# Patient Record
Sex: MALE | Race: WHITE | NOT HISPANIC OR LATINO | Employment: FULL TIME | URBAN - NONMETROPOLITAN AREA
[De-identification: names, ages, dates, MRNs, and addresses within clinical notes are randomized per-mention and may not be internally consistent; named-entity substitution may affect disease eponyms.]

---

## 2020-07-18 ENCOUNTER — HOSPITAL ENCOUNTER (EMERGENCY)
Facility: HOSPITAL | Age: 45
Discharge: HOME | End: 2020-07-18
Attending: EMERGENCY MEDICINE | Admitting: EMERGENCY MEDICINE
Payer: COMMERCIAL

## 2020-07-18 ENCOUNTER — APPOINTMENT (EMERGENCY)
Dept: NON INVASIVE DIAGNOSTICS | Facility: HOSPITAL | Age: 45
End: 2020-07-18
Payer: COMMERCIAL

## 2020-07-18 VITALS
HEIGHT: 69 IN | WEIGHT: 315 LBS | DIASTOLIC BLOOD PRESSURE: 77 MMHG | OXYGEN SATURATION: 99 % | RESPIRATION RATE: 16 BRPM | BODY MASS INDEX: 46.65 KG/M2 | HEART RATE: 76 BPM | TEMPERATURE: 98.1 F | SYSTOLIC BLOOD PRESSURE: 138 MMHG

## 2020-07-18 DIAGNOSIS — L03.115 CELLULITIS OF RIGHT LOWER LEG: Primary | ICD-10-CM

## 2020-07-18 DIAGNOSIS — R60.0 LEG EDEMA, RIGHT: ICD-10-CM

## 2020-07-18 LAB
ANION GAP SERPL CALCULATED.3IONS-SCNC: 9 MMOL/L (ref 4–13)
BASOPHILS # BLD AUTO: 0.05 THOUSANDS/ΜL (ref 0–0.1)
BASOPHILS NFR BLD AUTO: 1 % (ref 0–1)
BUN SERPL-MCNC: 13 MG/DL (ref 5–25)
CALCIUM SERPL-MCNC: 9.2 MG/DL (ref 8.3–10.1)
CHLORIDE SERPL-SCNC: 100 MMOL/L (ref 100–108)
CO2 SERPL-SCNC: 27 MMOL/L (ref 21–32)
CREAT SERPL-MCNC: 0.96 MG/DL (ref 0.6–1.3)
EOSINOPHIL # BLD AUTO: 0.32 THOUSAND/ΜL (ref 0–0.61)
EOSINOPHIL NFR BLD AUTO: 4 % (ref 0–6)
ERYTHROCYTE [DISTWIDTH] IN BLOOD BY AUTOMATED COUNT: 12.9 % (ref 11.6–15.1)
GFR SERPL CREATININE-BSD FRML MDRD: 95 ML/MIN/1.73SQ M
GLUCOSE SERPL-MCNC: 195 MG/DL (ref 65–140)
HCT VFR BLD AUTO: 44.1 % (ref 36.5–49.3)
HGB BLD-MCNC: 14.8 G/DL (ref 12–17)
IMM GRANULOCYTES # BLD AUTO: 0.04 THOUSAND/UL (ref 0–0.2)
IMM GRANULOCYTES NFR BLD AUTO: 1 % (ref 0–2)
LYMPHOCYTES # BLD AUTO: 1.58 THOUSANDS/ΜL (ref 0.6–4.47)
LYMPHOCYTES NFR BLD AUTO: 19 % (ref 14–44)
MCH RBC QN AUTO: 28.1 PG (ref 26.8–34.3)
MCHC RBC AUTO-ENTMCNC: 33.6 G/DL (ref 31.4–37.4)
MCV RBC AUTO: 84 FL (ref 82–98)
MONOCYTES # BLD AUTO: 0.56 THOUSAND/ΜL (ref 0.17–1.22)
MONOCYTES NFR BLD AUTO: 7 % (ref 4–12)
NEUTROPHILS # BLD AUTO: 5.99 THOUSANDS/ΜL (ref 1.85–7.62)
NEUTS SEG NFR BLD AUTO: 68 % (ref 43–75)
NRBC BLD AUTO-RTO: 0 /100 WBCS
PLATELET # BLD AUTO: 215 THOUSANDS/UL (ref 149–390)
PMV BLD AUTO: 11 FL (ref 8.9–12.7)
POTASSIUM SERPL-SCNC: 4.2 MMOL/L (ref 3.5–5.3)
RBC # BLD AUTO: 5.27 MILLION/UL (ref 3.88–5.62)
SODIUM SERPL-SCNC: 136 MMOL/L (ref 136–145)
WBC # BLD AUTO: 8.54 THOUSAND/UL (ref 4.31–10.16)

## 2020-07-18 PROCEDURE — 80048 BASIC METABOLIC PNL TOTAL CA: CPT | Performed by: PHYSICIAN ASSISTANT

## 2020-07-18 PROCEDURE — 99284 EMERGENCY DEPT VISIT MOD MDM: CPT

## 2020-07-18 PROCEDURE — 87040 BLOOD CULTURE FOR BACTERIA: CPT | Performed by: PHYSICIAN ASSISTANT

## 2020-07-18 PROCEDURE — 96375 TX/PRO/DX INJ NEW DRUG ADDON: CPT

## 2020-07-18 PROCEDURE — 99285 EMERGENCY DEPT VISIT HI MDM: CPT | Performed by: PHYSICIAN ASSISTANT

## 2020-07-18 PROCEDURE — 96365 THER/PROPH/DIAG IV INF INIT: CPT

## 2020-07-18 PROCEDURE — 85025 COMPLETE CBC W/AUTO DIFF WBC: CPT | Performed by: PHYSICIAN ASSISTANT

## 2020-07-18 PROCEDURE — 36415 COLL VENOUS BLD VENIPUNCTURE: CPT | Performed by: PHYSICIAN ASSISTANT

## 2020-07-18 PROCEDURE — 93971 EXTREMITY STUDY: CPT

## 2020-07-18 RX ORDER — SULFAMETHOXAZOLE AND TRIMETHOPRIM 400; 80 MG/1; MG/1
TABLET ORAL 2 TIMES DAILY
COMMUNITY
Start: 2020-07-13 | End: 2020-07-23

## 2020-07-18 RX ORDER — CLINDAMYCIN PHOSPHATE 600 MG/50ML
600 INJECTION INTRAVENOUS ONCE
Status: COMPLETED | OUTPATIENT
Start: 2020-07-18 | End: 2020-07-18

## 2020-07-18 RX ORDER — KETOROLAC TROMETHAMINE 30 MG/ML
15 INJECTION, SOLUTION INTRAMUSCULAR; INTRAVENOUS ONCE
Status: COMPLETED | OUTPATIENT
Start: 2020-07-18 | End: 2020-07-18

## 2020-07-18 RX ORDER — ENALAPRIL MALEATE 5 MG/1
10 TABLET ORAL DAILY
COMMUNITY

## 2020-07-18 RX ORDER — CLINDAMYCIN HYDROCHLORIDE 300 MG/1
300 CAPSULE ORAL 4 TIMES DAILY
Qty: 28 CAPSULE | Refills: 0 | Status: SHIPPED | OUTPATIENT
Start: 2020-07-18 | End: 2020-07-25

## 2020-07-18 RX ADMIN — KETOROLAC TROMETHAMINE 15 MG: 30 INJECTION, SOLUTION INTRAMUSCULAR at 13:33

## 2020-07-18 RX ADMIN — CLINDAMYCIN PHOSPHATE 600 MG: 600 INJECTION, SOLUTION INTRAVENOUS at 14:13

## 2020-07-18 NOTE — ED PROVIDER NOTES
History  Chief Complaint   Patient presents with    Cellulitis     pt seen at urgent care 6 days ago per right lower extremity swelling  currently taking abx per cellulitis c/o no improvement since w/increased pain, swelling, and drainage RLE  pt is truckdriver  denies fevers/sob/n/v/d     59-year-old male presents to the ED for evaluation of right lower extremity pain and swelling  Per patient approximately 2 weeks ago he had several bug bites on the right lower extremity from a backyard party  He reports he was itching at these and noticed the leg was getting red and swollen  Patient was seen at urgent care 6 days ago and started on Bactrim for cellulitis  Patient reports since taking Bactrim swelling has increased  He reports swelling is now into his foot  He additionally notes pain is worsening  He reports he has had some drainage from the leg  He does report that the redness is improving  Patient return to Urgent Care today who sent him for further evaluation concern for DVT vs worsening cellulitis  Patient denies history of DVT or PE  He denies any use of anticoagulation  Patient works as a   He denies any chest pain, shortness of breath, palpitations, abdominal pain, flank pain, hemoptysis  Patient denies any fevers or chills  Prior to Admission Medications   Prescriptions Last Dose Informant Patient Reported? Taking?   enalapril (VASOTEC) 5 mg tablet 7/18/2020 at Unknown time  Yes Yes   Sig: Take 10 mg by mouth daily   sulfamethoxazole-trimethoprim (BACTRIM) 400-80 mg per tablet 7/18/2020 at Unknown time  Yes Yes   Sig: Take by mouth 2 (two) times a day      Facility-Administered Medications: None       Past Medical History:   Diagnosis Date    Hypertension        Past Surgical History:   Procedure Laterality Date    CHOLECYSTECTOMY         History reviewed  No pertinent family history  I have reviewed and agree with the history as documented      E-Cigarette/Vaping    E-Cigarette Use Never User      E-Cigarette/Vaping Substances     Social History     Tobacco Use    Smoking status: Never Smoker    Smokeless tobacco: Current User     Types: Chew   Substance Use Topics    Alcohol use: Not Currently    Drug use: Never       Review of Systems   Constitutional: Negative for appetite change, chills, diaphoresis, fatigue and fever  HENT: Negative  Eyes: Negative for visual disturbance  Respiratory: Negative for cough, choking, chest tightness, shortness of breath and stridor  Cardiovascular: Positive for leg swelling  Negative for chest pain and palpitations  Gastrointestinal: Negative  Genitourinary: Negative  Musculoskeletal:        Right lower extremity swelling   Skin: Positive for color change  Redness of right lower extremity   Neurological: Negative  Physical Exam  Physical Exam   Constitutional: He is oriented to person, place, and time  He appears well-developed and well-nourished  No distress  HENT:   Head: Normocephalic and atraumatic  Mouth/Throat: No oropharyngeal exudate  Eyes: Pupils are equal, round, and reactive to light  Conjunctivae and EOM are normal    Neck: Normal range of motion  Cardiovascular: Normal rate and regular rhythm  No murmur heard  Pulmonary/Chest: Effort normal and breath sounds normal  No stridor  No respiratory distress  He has no wheezes  He has no rales  He exhibits no tenderness  Abdominal: Soft  Bowel sounds are normal  He exhibits no distension  There is no tenderness  There is no guarding  Musculoskeletal: Normal range of motion  He exhibits tenderness  He exhibits no deformity  Right ankle: He exhibits swelling  He exhibits normal range of motion, no ecchymosis, no deformity and no laceration  Right lower leg: He exhibits tenderness and swelling  He exhibits no bony tenderness  Legs:       Right foot: There is swelling   There is normal range of motion and no bony tenderness  Swelling of the right foot and calf  Normal ROM  Palpable dorsalis pedis and posterior tibialis pulses bilaterally  Normal cap refill  Neurological: He is alert and oriented to person, place, and time  He displays normal reflexes  No sensory deficit  He exhibits normal muscle tone  GCS eye subscore is 4  GCS verbal subscore is 5  GCS motor subscore is 6  Skin: Skin is warm and dry  Capillary refill takes less than 2 seconds  There is erythema  Swelling of the right foot and calf  Mild tenderness  No active drainage  Scabbed wounds noted on shin  Redness noted around the right ankle  Compartments soft and not concerning for comparment symptom   Psychiatric: He has a normal mood and affect  His behavior is normal  Judgment and thought content normal    Nursing note and vitals reviewed        Vital Signs  ED Triage Vitals [07/18/20 1309]   Temperature Pulse Respirations Blood Pressure SpO2   (!) 97 3 °F (36 3 °C) 80 18 166/97 98 %      Temp Source Heart Rate Source Patient Position - Orthostatic VS BP Location FiO2 (%)   Temporal Monitor Lying Right arm --      Pain Score       2           Vitals:    07/18/20 1309 07/18/20 1400 07/18/20 1430 07/18/20 1455   BP: 166/97 151/78 138/77 138/77   Pulse: 80 68 73 76   Patient Position - Orthostatic VS: Lying Lying Lying          Visual Acuity      ED Medications  Medications   ketorolac (TORADOL) injection 15 mg (15 mg Intravenous Given 7/18/20 1333)   clindamycin (CLEOCIN) IVPB (premix) 600 mg 50 mL (0 mg Intravenous Stopped 7/18/20 1451)       Diagnostic Studies  Results Reviewed     Procedure Component Value Units Date/Time    Basic metabolic panel [[id number]]  (Abnormal) Collected:  07/18/20 1332    Lab Status:  Final result Specimen:  Blood from Arm, Left Updated:  07/18/20 1355     Sodium 136 mmol/L      Potassium 4 2 mmol/L      Chloride 100 mmol/L      CO2 27 mmol/L      ANION GAP 9 mmol/L      BUN 13 mg/dL      Creatinine 0 96 mg/dL      Glucose 195 mg/dL      Calcium 9 2 mg/dL      eGFR 95 ml/min/1 73sq m     Narrative:       National Kidney Disease Foundation guidelines for Chronic Kidney Disease (CKD):     Stage 1 with normal or high GFR (GFR > 90 mL/min/1 73 square meters)    Stage 2 Mild CKD (GFR = 60-89 mL/min/1 73 square meters)    Stage 3A Moderate CKD (GFR = 45-59 mL/min/1 73 square meters)    Stage 3B Moderate CKD (GFR = 30-44 mL/min/1 73 square meters)    Stage 4 Severe CKD (GFR = 15-29 mL/min/1 73 square meters)    Stage 5 End Stage CKD (GFR <15 mL/min/1 73 square meters)  Note: GFR calculation is accurate only with a steady state creatinine    CBC and differential [[id number]] Collected:  07/18/20 1332    Lab Status:  Final result Specimen:  Blood from Arm, Left Updated:  07/18/20 1341     WBC 8 54 Thousand/uL      RBC 5 27 Million/uL      Hemoglobin 14 8 g/dL      Hematocrit 44 1 %      MCV 84 fL      MCH 28 1 pg      MCHC 33 6 g/dL      RDW 12 9 %      MPV 11 0 fL      Platelets 178 Thousands/uL      nRBC 0 /100 WBCs      Neutrophils Relative 68 %      Immat GRANS % 1 %      Lymphocytes Relative 19 %      Monocytes Relative 7 %      Eosinophils Relative 4 %      Basophils Relative 1 %      Neutrophils Absolute 5 99 Thousands/µL      Immature Grans Absolute 0 04 Thousand/uL      Lymphocytes Absolute 1 58 Thousands/µL      Monocytes Absolute 0 56 Thousand/µL      Eosinophils Absolute 0 32 Thousand/µL      Basophils Absolute 0 05 Thousands/µL     Blood culture #1 [[id number]] Collected:  07/18/20 1332    Lab Status: In process Specimen:  Blood from Arm, Left Updated:  07/18/20 1339    Blood culture #2 [[id number]] Collected:  07/18/20 1332    Lab Status:   In process Specimen:  Blood from Arm, Right Updated:  07/18/20 1339                 VAS lower limb venous duplex study, unilateral/limited    (Results Pending)              Procedures  Procedures         ED Course  ED Course as of Jul 18 1545   Sat Jul 18, 2020   1307 Tiger text sent to vascular technician      3217 Vascular technician reports negative DVT      1407 Normal WBC  CMP and BMP relatively unremarkable  Will start IV antibiotics  I had long discussion with patient and spouse regarding inpatient versus outpatient treatment  I advised and recommended inpatient treatment as patient has failed p o  Treatment  We discussed possible side effects of continued female outpatient including sepsis and death  Patient requesting IV antibiotics while in the ED and discharged home  We discussed symptoms that require prompt return to the ED for further evaluation and he verbalized understanding  1450 Patient received full dose of IV antibiotics  Patient was able to ambulate of the department without difficulty  Again patient was educated on symptoms that require prompt return to the ED for further evaluation verbalized understanding  Patient agreed with this treatment plan, remained well ED and was discharged home  US AUDIT      Most Recent Value   Initial Alcohol Screen: US AUDIT-C    1  How often do you have a drink containing alcohol? 1 Filed at: 07/18/2020 1312   2  How many drinks containing alcohol do you have on a typical day you are drinking? 0 Filed at: 07/18/2020 1312   3a  Male UNDER 65: How often do you have five or more drinks on one occasion? 0 Filed at: 07/18/2020 1312   Audit-C Score  1 Filed at: 07/18/2020 1312                  CORAL/DAST-10      Most Recent Value   How many times in the past year have you    Used an illegal drug or used a prescription medication for non-medical reasons? Never Filed at: 07/18/2020 1312                    MDM  Number of Diagnoses or Management Options  Cellulitis of right lower leg: new and requires workup  Diagnosis management comments: Differential diagnosis includes but not limited to:  Cellulitis, DVT, compartment syndrome   Vitals medical record reviewed  Patient has a swelling and redness right lower extremity    Noted scab wounds from reported bug bites  Has been on Bactrim without significant of symptoms  Duplex was negative for vascular technician  Calf was swollen however hard compartment was relatively soft, pain was not out of proportion to exam   No leukocytosis  Patient received IV clindamycin  Recommended inpatient treatment however patient refused requested IV antibiotics in the ED and discharged on oral antibiotics  Patient was educated on symptoms that require prompt return to the ED for further evaluation verbalized understanding  Patient agreed to this treatment plan, remained well ED and was discharged home       Amount and/or Complexity of Data Reviewed  Clinical lab tests: ordered and reviewed  Tests in the radiology section of CPT®: ordered and reviewed  Discussion of test results with the performing providers: yes  Review and summarize past medical records: yes  Independent visualization of images, tracings, or specimens: yes          Disposition  Final diagnoses:   Cellulitis of right lower leg     Time reflects when diagnosis was documented in both MDM as applicable and the Disposition within this note     Time User Action Codes Description Comment    7/18/2020  2:00 PM Shanna Evangelista Add [R60 0] Leg edema, right     7/18/2020  2:39 PM Elizabeth Rivera Add [L03 115] Cellulitis of right lower leg       ED Disposition     ED Disposition Condition Date/Time Comment    Discharge Stable Sat Jul 18, 2020  2:39 PM Carline Price discharge to home/self care              Follow-up Information     Follow up With Specialties Details Why Drew U  94  In 1 week As needed, If symptoms worsen [address] [place] [id number]  [phone]            Discharge Medication List as of 7/18/2020  2:42 PM      START taking these medications    Details   clindamycin (CLEOCIN) 300 MG capsule Take 1 capsule (300 mg total) by mouth 4 (four) times a day for 7 days, Starting Sat 7/18/2020, Until Sat 7/25/2020, Normal         CONTINUE these medications which have NOT CHANGED    Details   enalapril (VASOTEC) 5 mg tablet Take 10 mg by mouth daily, Historical Med      sulfamethoxazole-trimethoprim (BACTRIM) 400-80 mg per tablet Take by mouth 2 (two) times a day, Starting Mon 7/13/2020, Until Thu 7/23/2020, Historical Med           No discharge procedures on file      PDMP Review     None          ED Provider  Electronically Signed by           Abigail Michele PA-C  07/18/20 7791

## 2020-07-19 PROCEDURE — 93971 EXTREMITY STUDY: CPT | Performed by: SURGERY

## 2020-07-23 LAB
BACTERIA BLD CULT: NORMAL
BACTERIA BLD CULT: NORMAL

## 2022-01-05 ENCOUNTER — APPOINTMENT (OUTPATIENT)
Dept: RADIOLOGY | Facility: CLINIC | Age: 47
End: 2022-01-05
Payer: COMMERCIAL

## 2022-01-05 ENCOUNTER — OFFICE VISIT (OUTPATIENT)
Dept: URGENT CARE | Facility: CLINIC | Age: 47
End: 2022-01-05
Payer: COMMERCIAL

## 2022-01-05 VITALS
BODY MASS INDEX: 46.65 KG/M2 | WEIGHT: 315 LBS | HEIGHT: 69 IN | TEMPERATURE: 100.9 F | SYSTOLIC BLOOD PRESSURE: 136 MMHG | RESPIRATION RATE: 20 BRPM | DIASTOLIC BLOOD PRESSURE: 96 MMHG | HEART RATE: 98 BPM | OXYGEN SATURATION: 94 %

## 2022-01-05 DIAGNOSIS — R68.89 FLU-LIKE SYMPTOMS: ICD-10-CM

## 2022-01-05 DIAGNOSIS — J18.9 PNEUMONIA OF BOTH LOWER LOBES DUE TO INFECTIOUS ORGANISM: Primary | ICD-10-CM

## 2022-01-05 DIAGNOSIS — J06.9 VIRAL URI: ICD-10-CM

## 2022-01-05 LAB
GLUCOSE SERPL-MCNC: 145 MG/DL (ref 65–140)
SL AMB POCT GLUCOSE BLD: 145

## 2022-01-05 PROCEDURE — 71046 X-RAY EXAM CHEST 2 VIEWS: CPT

## 2022-01-05 PROCEDURE — 82948 REAGENT STRIP/BLOOD GLUCOSE: CPT | Performed by: PHYSICIAN ASSISTANT

## 2022-01-05 PROCEDURE — U0003 INFECTIOUS AGENT DETECTION BY NUCLEIC ACID (DNA OR RNA); SEVERE ACUTE RESPIRATORY SYNDROME CORONAVIRUS 2 (SARS-COV-2) (CORONAVIRUS DISEASE [COVID-19]), AMPLIFIED PROBE TECHNIQUE, MAKING USE OF HIGH THROUGHPUT TECHNOLOGIES AS DESCRIBED BY CMS-2020-01-R: HCPCS | Performed by: PHYSICIAN ASSISTANT

## 2022-01-05 PROCEDURE — 96372 THER/PROPH/DIAG INJ SC/IM: CPT | Performed by: PHYSICIAN ASSISTANT

## 2022-01-05 PROCEDURE — 99213 OFFICE O/P EST LOW 20 MIN: CPT | Performed by: PHYSICIAN ASSISTANT

## 2022-01-05 RX ORDER — ENALAPRIL MALEATE 10 MG/1
TABLET ORAL
COMMUNITY
Start: 2021-10-12

## 2022-01-05 RX ORDER — ERGOCALCIFEROL (VITAMIN D2) 1250 MCG
50000 CAPSULE ORAL
COMMUNITY

## 2022-01-05 RX ORDER — GUAIFENESIN 100 MG/5ML
200 SYRUP ORAL 3 TIMES DAILY PRN
COMMUNITY

## 2022-01-05 RX ORDER — AZITHROMYCIN 250 MG/1
TABLET, FILM COATED ORAL
Qty: 6 TABLET | Refills: 0 | Status: SHIPPED | OUTPATIENT
Start: 2022-01-05 | End: 2022-01-09

## 2022-01-05 RX ORDER — KETOROLAC TROMETHAMINE 30 MG/ML
30 INJECTION, SOLUTION INTRAMUSCULAR; INTRAVENOUS ONCE
Status: COMPLETED | OUTPATIENT
Start: 2022-01-05 | End: 2022-01-05

## 2022-01-05 RX ADMIN — KETOROLAC TROMETHAMINE 30 MG: 30 INJECTION, SOLUTION INTRAMUSCULAR; INTRAVENOUS at 16:00

## 2022-01-05 NOTE — PATIENT INSTRUCTIONS
Patient given pulse oximeter to use at home with strict instructions to the emergency room if it drops below 90% with or without ambulation  He was also given symptoms spirometer to use 7-10 times an hour while awake  Today you were tested for COVID-19 and influenza  Results will return approximately 5 days  The fastest way to receive results is to download the [facility] MyChart amol on your phone or great account on a computer  If you view your results on [employer], we will not call you  If you do not see results on [employer], we will call you if your positive or negative  WE CANNOT PRINT YOUR TEST RESULTS FROM THE URGENT CARE  This is against a law called HIPAA  You may print results from your MyChart (IT help [phone] option 5) or call medical records at [phone]  Prophylactically self quarantine  Department of health's newest recommendations as of December 27,2021 state the following:    IF you TEST POSITIVE for COVID-19  -stay home for 5 days  If you have no symptoms or your symptoms are resolving after 5 days, you can leave your house  Continue to wear a mask around others for 5 additional days  If you have a fever, continue to stay home until you fever resolves  IF YOU WERE EXPOSED TO SOMEONE WITH COVID 19  -if you have been boosted OR completed the primary series of Pfizer or Moderna vaccine within the last 6 months OR completed the primary series of J&J vaccine within the last 2 months, wear a mask around others for 10 days  Get tested on day 5 if possible  If you develop symptoms, get a test and stay home     -if you completed the primary series of Pfizer or Moderna vaccine over 6 months ago and are NOT boosted OR completed the primary series of J&J over 2 months ago and are NOT boosted OR are unvaccinated, stay home for 5 days  After that continue to wear a mask around others for 5 additional days  If you can not quarantine you must wear a mask for 10 days    Test on day 5 if possible  If you develops symptoms get a test and stay home  Drink lots of fluids to maintain hydration  Do not touch your face, wash hands often, and practice social distancing  There is no treatment for simple outpatient COVID-19 patients however, CDC recommends 2000 units vitamin D3 to boost the immune system  Those with severe illness, older age, or multiple comorbidities may qualify for monoclonal antibody infusions as treatment  Please call your doctor to see if you qualify  Call your family doctor to have a follow-up appointment in next few days  Go to ER if he began experiencing chest pain, shortness of breath, fever that is not responding to antipyretics or other severe symptoms

## 2022-01-05 NOTE — LETTER
[facility] [place]  [address] [place] [address]  Dept: [phone]    January 5, 2022    Patient: Cem Neil  YOB: 1975    Cem Neil was seen and evaluated at our [facility]  Please note if Covid and Flu tests are negative, they may return to work when fever free for 24 hours without the use of a fever reducing agent  If Covid or Flu test is positive, they may return to work on 1/7/2022, as this is 5 days from the onset of symptoms  Upon return, they must then adhere to strict masking for an additional 5 days      Sincerely,    Bc Lombardo PA-C

## 2022-01-05 NOTE — PROGRESS NOTES
[facility]        NAME: Abdiaziz Jean Baptiste is a 55 y o  male  : 1975    MRN: [id number]  DATE: 2022  TIME: 4:23 PM    Assessment and Plan   Pneumonia of both lower lobes due to infectious organism [J18 9]  1  Pneumonia of both lower lobes due to infectious organism  COVID Only -Office Collect    XR chest pa & lateral    azithromycin (ZITHROMAX) 250 mg tablet   2  Flu-like symptoms  ketorolac (TORADOL) injection 30 mg    POCT blood glucose     Improvement of headache after Toradol injection  Chest x-ray:  Per Radiology, "Suspected developing infiltrate/atelectasis both lung bases"  Will treat for bacterial pneumonia and also rule out COVID-19/ COVID-19 pneumonia  Patient given pulse oximeter to use at home with strict instructions to the emergency room if it drops below 90% with or without ambulation  He was also given symptoms spirometer to use 7-10 times an hour while awake  Patient Instructions   Today you were tested for COVID-19 and influenza  Results will return approximately 5 days  The fastest way to receive results is to download the [facility] [employer] amol on your phone or great account on a computer  If you view your results on [employer], we will not call you  If you do not see results on [employer], we will call you if your positive or negative  WE CANNOT PRINT YOUR TEST RESULTS FROM THE URGENT CARE  This is against a law called HIPAA  You may print results from your [employer] (IT help [phone] option 5) or call medical records at [phone]  Prophylactically self quarantine  Department of health's newest recommendations as of  state the following:    IF you TEST POSITIVE for COVID-19  -stay home for 5 days  If you have no symptoms or your symptoms are resolving after 5 days, you can leave your house  Continue to wear a mask around others for 5 additional days  If you have a fever, continue to stay home until you fever resolves      IF YOU WERE EXPOSED TO SOMEONE WITH COVID 19  -if you have been boosted OR completed the primary series of Pfizer or Moderna vaccine within the last 6 months OR completed the primary series of J&J vaccine within the last 2 months, wear a mask around others for 10 days  Get tested on day 5 if possible  If you develop symptoms, get a test and stay home     -if you completed the primary series of Pfizer or Moderna vaccine over 6 months ago and are NOT boosted OR completed the primary series of J&J over 2 months ago and are NOT boosted OR are unvaccinated, stay home for 5 days  After that continue to wear a mask around others for 5 additional days  If you can not quarantine you must wear a mask for 10 days  Test on day 5 if possible  If you develops symptoms get a test and stay home  Drink lots of fluids to maintain hydration  Do not touch your face, wash hands often, and practice social distancing  There is no treatment for simple outpatient COVID-19 patients however, CDC recommends 2000 units vitamin D3 to boost the immune system  Those with severe illness, older age, or multiple comorbidities may qualify for monoclonal antibody infusions as treatment  Please call your doctor to see if you qualify  Call your family doctor to have a follow-up appointment in next few days  Go to ER if he began experiencing chest pain, shortness of breath, fever that is not responding to antipyretics or other severe symptoms  Follow up with PCP in 3-5 days  Proceed to  ER if symptoms worsen  Chief Complaint     Chief Complaint   Patient presents with    COVID-19     h/a x 3 days with nausea, hands tingling, fatigue and cough with congestion and runny nose         History of Present Illness       Patient is a 17-year-old male with significant past medical history of hypertension and diabetes presents the office complaining of fatigue, body aches, fevers chills, headache, congestion, rhinorrhea, and cough for 3 days  Patient also reports goes in severe coughing fits which causes his hands to tingle but resolves after coughing  He denies SOB, CP, difficulty breathing, loss of smell or taste, nausea, vomiting, or abdominal pain  Denies any known exposure to COVID-19  Patient's biggest complaint right now is his headache which is rated 8/10 described as a halo around his skull  Review of Systems   Review of Systems   Constitutional: Positive for chills, fatigue and fever  HENT: Positive for congestion, postnasal drip, rhinorrhea and sore throat  Respiratory: Positive for cough  Negative for shortness of breath  Cardiovascular: Negative for chest pain and palpitations  Gastrointestinal: Positive for nausea  Negative for abdominal pain, diarrhea and vomiting  Musculoskeletal: Positive for myalgias  Neurological: Positive for dizziness and headaches  Negative for syncope, weakness and light-headedness  Current Medications       Current Outpatient Medications:     enalapril (VASOTEC) 10 mg tablet, , Disp: , Rfl:     enalapril (VASOTEC) 5 mg tablet, Take 10 mg by mouth daily, Disp: , Rfl:     ergocalciferol (ERGOCALCIFEROL) 1 25 MG (79693 UT) capsule, Take 50,000 Units by mouth, Disp: , Rfl:     guaiFENesin (ROBITUSSIN) 100 mg/5 mL syrup, Take 200 mg by mouth 3 (three) times a day as needed for cough, Disp: , Rfl:     metFORMIN (GLUCOPHAGE) 500 mg tablet, , Disp: , Rfl:     azithromycin (ZITHROMAX) 250 mg tablet, Take 2 tablets today then 1 tablet daily x 4 days, Disp: 6 tablet, Rfl: 0  No current facility-administered medications for this visit      Current Allergies     Allergies as of 01/05/2022 - Reviewed 01/05/2022   Allergen Reaction Noted    Penicillins Throat Swelling 07/18/2020            The following portions of the patient's history were reviewed and updated as appropriate: allergies, current medications, past family history, past medical history, past social history, past surgical history and problem list      Past Medical History:   Diagnosis Date    Diabetes mellitus ([address] )     Hypertension        Past Surgical History:   Procedure Laterality Date    ADENOIDECTOMY      CHOLECYSTECTOMY      TONSILLECTOMY      WISDOM TOOTH EXTRACTION         Family History   Problem Relation Age of Onset    Cancer Mother     Heart disease Mother     Stroke Mother     No Known Problems Father          Medications have been verified  Objective   /96   Pulse 98   Temp (!) 100 9 °F (38 3 °C)   Resp 20   Ht 5' 9" (1 753 m)   Wt (!) 148 kg (326 lb)   SpO2 94%   BMI 48 14 kg/m²   No LMP for male patient  Physical Exam     Physical Exam  Vitals and nursing note reviewed  Constitutional:       General: He is not in acute distress  Appearance: Normal appearance  He is well-developed  He is ill-appearing  He is not toxic-appearing  HENT:      Head: Normocephalic and atraumatic  Right Ear: Tympanic membrane, ear canal and external ear normal       Left Ear: Tympanic membrane, ear canal and external ear normal       Nose: Congestion and rhinorrhea present  Mouth/Throat:      Pharynx: Uvula midline  Eyes:      General: Lids are normal       Conjunctiva/sclera: Conjunctivae normal       Pupils: Pupils are equal, round, and reactive to light  Cardiovascular:      Rate and Rhythm: Normal rate and regular rhythm  Heart sounds: Normal heart sounds  No murmur heard  No friction rub  No gallop  Pulmonary:      Effort: Pulmonary effort is normal       Breath sounds: Normal breath sounds  No stridor  No wheezing or rales  Musculoskeletal:         General: Normal range of motion  Cervical back: Neck supple  Skin:     General: Skin is warm and dry  Capillary Refill: Capillary refill takes less than 2 seconds  Neurological:      Mental Status: He is alert           Chest x-ray:  Per Radiology, "Suspected developing infiltrate/atelectasis both lung bases"    POC glucose: 145

## 2022-01-08 LAB — SARS-COV-2 RNA RESP QL NAA+PROBE: NEGATIVE

## 2022-01-10 ENCOUNTER — TELEPHONE (OUTPATIENT)
Dept: URGENT CARE | Facility: CLINIC | Age: 47
End: 2022-01-10

## 2022-01-10 NOTE — TELEPHONE ENCOUNTER
Patient did not answer the phone  I left a message requesting them to [facility] the [facility] at their earliest convenience  10:10 am Patient returned call, verified via full name and , and was informed of negative test results for COVID-19  Patient did not have any further questions